# Patient Record
Sex: MALE | Race: WHITE | NOT HISPANIC OR LATINO | Employment: FULL TIME | URBAN - METROPOLITAN AREA
[De-identification: names, ages, dates, MRNs, and addresses within clinical notes are randomized per-mention and may not be internally consistent; named-entity substitution may affect disease eponyms.]

---

## 2020-11-17 ENCOUNTER — HOSPITAL ENCOUNTER (EMERGENCY)
Facility: HOSPITAL | Age: 35
Discharge: HOME | End: 2020-11-17
Attending: EMERGENCY MEDICINE

## 2020-11-17 VITALS
TEMPERATURE: 98 F | RESPIRATION RATE: 20 BRPM | SYSTOLIC BLOOD PRESSURE: 179 MMHG | HEIGHT: 70 IN | OXYGEN SATURATION: 98 % | DIASTOLIC BLOOD PRESSURE: 99 MMHG | WEIGHT: 210.56 LBS | HEART RATE: 95 BPM | BODY MASS INDEX: 30.14 KG/M2

## 2020-11-17 DIAGNOSIS — S39.012A STRAIN OF LUMBAR REGION, INITIAL ENCOUNTER: Primary | ICD-10-CM

## 2020-11-17 PROCEDURE — 99284 EMERGENCY DEPT VISIT MOD MDM: CPT

## 2020-11-17 RX ORDER — NAPROXEN 500 MG/1
500 TABLET ORAL 2 TIMES DAILY WITH MEALS
Qty: 20 TABLET | Refills: 0 | Status: SHIPPED | OUTPATIENT
Start: 2020-11-17

## 2020-11-17 RX ORDER — CYCLOBENZAPRINE HCL 10 MG
10 TABLET ORAL 3 TIMES DAILY PRN
Qty: 15 TABLET | Refills: 0 | Status: SHIPPED | OUTPATIENT
Start: 2020-11-17 | End: 2020-11-22

## 2020-11-17 NOTE — ED PROVIDER NOTES
SCRIBE #1 NOTE: I, Prudence Martines, am scribing for, and in the presence of, Bert Koo Jr., MD. I have scribed the entire note.      History      Chief Complaint   Patient presents with    Back Pain     bilateral lower back pain onset yesterday; states has had previous muscle injury in same area       Review of patient's allergies indicates:  No Known Allergies     HPI   HPI    11/17/2020, 10:38 AM   History obtained from the patient      History of Present Illness: Sang Burger is a 35 y.o. male patient who presents to the Emergency Department for bilateral lower back pain, onset yesterday morning. Pt reports previous injury in the same area from bending over at work. Pt states this current pain began suddenly when he was getting out of bed. Symptoms are constant and moderate in severity. No mitigating or exacerbating factors reported. No associated sxs reported. Patient denies any fever, chills, neck pain, weakness, tingling, numbness, nausea, vomiting, flank pain, and all other sxs at this time. Prior Tx includes Tylenol taken at 7:15 AM and heating pad with no relief of sx. No further complaints or concerns at this time.         Arrival mode: Personal vehicle     PCP: Primary Doctor No       Past Medical History:  No past medical history on file.    Past Surgical History:  No past surgical history on file.      Family History:  No family history on file.    Social History:  Social History     Tobacco Use    Smoking status: Not on file   Substance and Sexual Activity    Alcohol use: Not on file    Drug use: Not on file    Sexual activity: Not on file       ROS   Review of Systems   Constitutional: Negative for chills, diaphoresis, fatigue and fever.   HENT: Negative for sore throat.    Respiratory: Negative for shortness of breath.    Cardiovascular: Negative for chest pain.   Gastrointestinal: Negative for abdominal pain, constipation, diarrhea, nausea and vomiting.   Genitourinary: Negative for  dysuria and flank pain.   Musculoskeletal: Positive for back pain (nell lower). Negative for neck pain.   Skin: Negative for rash.   Neurological: Negative for dizziness, weakness, light-headedness, numbness and headaches.        (-) tingling   Hematological: Does not bruise/bleed easily.   All other systems reviewed and are negative.      Physical Exam      Initial Vitals [11/17/20 1000]   BP Pulse Resp Temp SpO2   (!) 179/99 95 20 98 °F (36.7 °C) 98 %      MAP       --          Physical Exam  Nursing Notes and Vital Signs Reviewed.  Constitutional: Patient is in mild distress. Well-developed and well-nourished.  Head: Atraumatic. Normocephalic.  Eyes: PERRL. EOM intact. Conjunctivae are not pale. No scleral icterus.  ENT: Mucous membranes are moist. Oropharynx is clear and symmetric.    Neck: Supple. Full ROM. No lymphadenopathy.No cervical midline bony tenderness, deformities, or step-offs.   Back:  Lower back paraspinal muscle tenderness that is worse on the left. No radiation of pain. Neurovascularly intact distal to pain. Normal lower extremity relfexes. Tendons intact. Skin intact.   No midline bony tenderness, deformities, or step-offs of the T-spine or L-spine. Skin appears normal without abrasions or bruising. No erythema, induration, or fluctuance.   Neurological: Awake and alert. Appropriate for age. Positivenegative straight leg raise bilaterally. No strength deficit; equal and 5/5 in bilateral upper and lower extremities. No light touch sensory deficit. DTRs 2+ and equal. Normal gait. No acute focal neurological deficits noted.  Cardiovascular: Regular rate. Regular rhythm. No murmurs, rubs, or gallops. Distal pulses are 2+ and symmetric.  Pulmonary/Chest: No respiratory distress. Clear to auscultation bilaterally. No wheezing or rales.  Abdominal: Soft and non-distended.  There is no tenderness.  No rebound, guarding, or rigidity. Good bowel sounds.  Genitourinary: No CVA tenderness.   Musculoskeletal:  "Moves all extremities. No obvious deformities. No edema. No calf tenderness.  Skin: Warm and dry.  Neurological:  Alert, awake, and appropriate.  Normal speech.  No acute focal neurological deficits are appreciated.  Psychiatric: Normal affect. Good eye contact. Appropriate in content.    ED Course    Procedures  ED Vital Signs:  Vitals:    11/17/20 1000   BP: (!) 179/99   Pulse: 95   Resp: 20   Temp: 98 °F (36.7 °C)   TempSrc: Oral   SpO2: 98%   Weight: 95.5 kg (210 lb 8.6 oz)   Height: 5' 10" (1.778 m)       Abnormal Lab Results:  Labs Reviewed - No data to display     All Lab Results:  none    Imaging Results:  Imaging Results    None                 The Emergency Provider reviewed the vital signs and test results, which are outlined above.    ED Discussion     10:44 AM: Reassessed pt at this time.   Discussed with pt all pertinent ED information and results. Discussed pt dx and plan of tx. Gave pt all f/u and return to the ED instructions. All questions and concerns were addressed at this time. Pt expresses understanding of information and instructions, and is comfortable with plan to discharge. Pt is stable for discharge.    I discussed with patient and/or family/caretaker that evaluation in the ED does not suggest any emergent or life threatening medical conditions requiring immediate intervention beyond what was provided in the ED, and I believe patient is safe for discharge.  Regardless, an unremarkable evaluation in the ED does not preclude the development or presence of a serious of life threatening condition. As such, patient was instructed to return immediately for any worsening or change in current symptoms.    Regarding BACK PAIN, I advised patient to rest and slowly start to increase activity as pain decreases or as tolerable.  Also recommend the use of ice and heat. Explained to patient that ice will help decrease swelling and pain and prevent tissue damage (verbalized importance of covering ice with " a towel and not applying it directly to skin and applying it for 20-30 minutes every 2 hours as needed). Further explained that heat will help decrease pain and muscle spasms (instructed patient to not fall asleep with heating pad and to apply heat to lower back for 20-30 minutes every 2 hours as needed). For prevention, I recommended that the patient use correct body movements (bend at the hips and knees when picking up objects and use leg muscles as you lift the load; keep objects close to chest when lifting it; and avoid twisting or lifting anything above the waist; change position often when standing for long periods of time; never reach, pull, or push while seated; exercise frequently and warm up before exercising; and maintain a healthy weight.  Follow up with primary care provider if no improvement is noticed in next three days.  Take all medications as prescribed and avoid operating heavy machinery or driving if prescribed pain medications or muscle relaxants.  Instructed patient to return to the emergency department if they develop incontinence, notice increased swelling or pain in lower back; begin to have difficulty moving lower extremities; or develop numbness to legs.          ED Medication(s):  Medications - No data to display    Follow-up Information     [facility]. Schedule an appointment as soon as possible for a visit in 1 week.    Contact information:  [address] [facility] [address] [phone]             [facility]. Schedule an appointment as soon as possible for a visit in 1 week.    Specialty: Internal Medicine  Contact information:  [address] [facility] [address] [phone]  Additional information:  2nd Floor - From [address], take the [facility] exit ([address] Enter the facility from the [address]                 Discharge Medication List as of 11/17/2020 10:41 AM      START taking these medications    Details    cyclobenzaprine (FLEXERIL) 10 MG tablet Take 1 tablet (10 mg total) by mouth 3 (three) times daily as needed for Muscle spasms., Starting Tue 11/17/2020, Until Sun 11/22/2020, Print      naproxen (NAPROSYN) 500 MG tablet Take 1 tablet (500 mg total) by mouth 2 (two) times daily with meals., Starting Tue 11/17/2020, Print             Medical Decision Making              Scribe Attestation:   Scribe #1: I performed the above scribed service and the documentation accurately describes the services I performed. I attest to the accuracy of the note.    Attending:   Physician Attestation Statement for Scribe #1: I, Bert Koo Jr., MD, personally performed the services described in this documentation, as scribed by Prudence Martines, in my presence, and it is both accurate and complete.          Clinical Impression       ICD-10-CM ICD-9-CM   1. Strain of lumbar region, initial encounter  S39.012A 847.2       Disposition:   Disposition: Discharged  Condition: Stable         Bert Koo Jr., MD  11/17/20 1939

## 2020-11-17 NOTE — DISCHARGE INSTRUCTIONS
Regarding BACK PAIN, I advised patient to rest and slowly start to increase activity as pain decreases or as tolerable.  Also recommend the use of ice and heat. Explained to patient that ice will help decrease swelling and pain and prevent tissue damage (verbalized importance of covering ice with a towel and not applying it directly to skin and applying it for 20-30 minutes every 2 hours as needed). Further explained that heat will help decrease pain and muscle spasms (instructed patient to not fall asleep with heating pad and to apply heat to lower back for 20-30 minutes every 2 hours as needed). For prevention, I recommended that the patient use correct body movements (bend at the hips and knees when picking up objects and use leg muscles as you lift the load; keep objects close to chest when lifting it; and avoid twisting or lifting anything above the waist; change position often when standing for long periods of time; never reach, pull, or push while seated; exercise frequently and warm up before exercising; and maintain a healthy weight.  Follow up with primary care provider if no improvement is noticed in next three days.  Take all medications as prescribed and avoid operating heavy machinery or driving if prescribed pain medications or muscle relaxants.  Instructed patient to return to the emergency department if they develop incontinence, notice increased swelling or pain in lower back; begin to have difficulty moving lower extremities; or develop numbness to legs.     Driving or other activities under influence of medications - Patient and/or family/caretaker was given a prescription for, or instructed to use a medicine that may impair ability to drive, operate machinery, or participate in other potentially dangerous activities.  Patient was instructed not to participate in these activities while under the influence of these medications.